# Patient Record
Sex: FEMALE | Race: OTHER | HISPANIC OR LATINO | URBAN - METROPOLITAN AREA
[De-identification: names, ages, dates, MRNs, and addresses within clinical notes are randomized per-mention and may not be internally consistent; named-entity substitution may affect disease eponyms.]

---

## 2019-10-31 ENCOUNTER — OUTPATIENT (OUTPATIENT)
Dept: OUTPATIENT SERVICES | Facility: HOSPITAL | Age: 84
LOS: 1 days | End: 2019-10-31
Payer: MEDICARE

## 2019-10-31 ENCOUNTER — APPOINTMENT (OUTPATIENT)
Dept: ORTHOPEDIC SURGERY | Facility: CLINIC | Age: 84
End: 2019-10-31

## 2019-10-31 PROCEDURE — 73521 X-RAY EXAM HIPS BI 2 VIEWS: CPT | Mod: 26

## 2019-10-31 PROCEDURE — 73521 X-RAY EXAM HIPS BI 2 VIEWS: CPT

## 2019-10-31 NOTE — PHYSICAL EXAM
[de-identified] : On exam, she is well appearing. She ambulates without assistance. \par Examination of her hips and back reveals tenderness at bilateral SI joints and her right trochanteric bursa, although to a lesser extent. \par Hip examination reveals significant stiffness bilaterally, but minimal complaints of pain.\par Distally she is 5/5 bilaterally without any sensory deficits.  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

## 2019-10-31 NOTE — DISCUSSION/SUMMARY
[de-identified] : Ms. Umana received bilateral SI joint injections without any issues. She will return if any issues arise. She was delighted with today's visit.

## 2019-10-31 NOTE — HISTORY OF PRESENT ILLNESS
[de-identified] : Ms. Umana is a pleasant 89 year old female who presents with a chronic history of bilateral SI joint pain, which has been previously injected with months of pain relief each time. She would like an injection bilaterally today. She denies trauma.

## 2019-10-31 NOTE — PROCEDURE
[Bilateral] : bilaterally.   [Major Joint___] : [unfilled] joint [Joint Pain] : Joint pain [Patient] : patient [Risk] : Risk [Benefits] : benefits [Alternatives] : alternatives [Bleeding] : bleeding [Verbal Consent Obtained] : verbal consent was obtained prior to the procedure [Posterior] : posterior [22] : a 22-gauge [1% Lidocaine___(mL)] : [unfilled] mL of 1% Lidocaine [Triamcinolone 40mg/mL___(mL)] : [unfilled] ~UmL of 40mg/mL triamcinolone [Bandage Applied] : a bandage [Tolerated Well] : The patient tolerated the procedure well [None] : None [No Strenuous Activity___day(s)] : avoid strenuous activity for [unfilled] day(s) [PRN] : as needed

## 2020-10-15 ENCOUNTER — APPOINTMENT (OUTPATIENT)
Dept: ORTHOPEDIC SURGERY | Facility: CLINIC | Age: 85
End: 2020-10-15

## 2020-10-15 ENCOUNTER — OUTPATIENT (OUTPATIENT)
Dept: OUTPATIENT SERVICES | Facility: HOSPITAL | Age: 85
LOS: 1 days | End: 2020-10-15
Payer: MEDICARE

## 2020-10-15 ENCOUNTER — EMERGENCY (EMERGENCY)
Facility: HOSPITAL | Age: 85
LOS: 1 days | Discharge: HOME | End: 2020-10-15
Attending: EMERGENCY MEDICINE | Admitting: EMERGENCY MEDICINE
Payer: MEDICARE

## 2020-10-15 ENCOUNTER — RESULT REVIEW (OUTPATIENT)
Age: 85
End: 2020-10-15

## 2020-10-15 VITALS
TEMPERATURE: 99 F | HEIGHT: 63 IN | WEIGHT: 99.21 LBS | SYSTOLIC BLOOD PRESSURE: 131 MMHG | OXYGEN SATURATION: 95 % | RESPIRATION RATE: 16 BRPM | HEART RATE: 68 BPM | DIASTOLIC BLOOD PRESSURE: 85 MMHG

## 2020-10-15 VITALS
TEMPERATURE: 99 F | HEART RATE: 71 BPM | OXYGEN SATURATION: 96 % | DIASTOLIC BLOOD PRESSURE: 75 MMHG | RESPIRATION RATE: 17 BRPM | SYSTOLIC BLOOD PRESSURE: 156 MMHG
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
## 2020-10-15 DIAGNOSIS — S00.03XA CONTUSION OF SCALP, INITIAL ENCOUNTER: ICD-10-CM

## 2020-10-15 DIAGNOSIS — S50.312A ABRASION OF LEFT ELBOW, INITIAL ENCOUNTER: ICD-10-CM

## 2020-10-15 DIAGNOSIS — Z23 ENCOUNTER FOR IMMUNIZATION: ICD-10-CM

## 2020-10-15 DIAGNOSIS — Z20.828 CONTACT WITH AND (SUSPECTED) EXPOSURE TO OTHER VIRAL COMMUNICABLE DISEASES: ICD-10-CM

## 2020-10-15 DIAGNOSIS — Z88.5 ALLERGY STATUS TO NARCOTIC AGENT: ICD-10-CM

## 2020-10-15 DIAGNOSIS — S09.90XA UNSPECIFIED INJURY OF HEAD, INITIAL ENCOUNTER: ICD-10-CM
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
## 2020-10-15 LAB — SARS-COV-2 RNA SPEC QL NAA+PROBE: SIGNIFICANT CHANGE UP

## 2020-10-15 PROCEDURE — 73080 X-RAY EXAM OF ELBOW: CPT | Mod: 26,LT

## 2020-10-15 PROCEDURE — 73521 X-RAY EXAM HIPS BI 2 VIEWS: CPT | Mod: 26

## 2020-10-15 PROCEDURE — 99284 EMERGENCY DEPT VISIT MOD MDM: CPT | Mod: 25

## 2020-10-15 PROCEDURE — U0003: CPT

## 2020-10-15 PROCEDURE — 73080 X-RAY EXAM OF ELBOW: CPT

## 2020-10-15 PROCEDURE — 90471 IMMUNIZATION ADMIN: CPT

## 2020-10-15 PROCEDURE — 73521 X-RAY EXAM HIPS BI 2 VIEWS: CPT

## 2020-10-15 PROCEDURE — 70450 CT HEAD/BRAIN W/O DYE: CPT | Mod: 26

## 2020-10-15 PROCEDURE — 70450 CT HEAD/BRAIN W/O DYE: CPT

## 2020-10-15 RX ORDER — ACETAMINOPHEN 500 MG
650 TABLET ORAL ONCE
Refills: 0 | Status: COMPLETED | OUTPATIENT
Start: 2020-10-15 | End: 2020-10-15

## 2020-10-15 RX ORDER — TETANUS TOXOID, REDUCED DIPHTHERIA TOXOID AND ACELLULAR PERTUSSIS VACCINE, ADSORBED 5; 2.5; 8; 8; 2.5 [IU]/.5ML; [IU]/.5ML; UG/.5ML; UG/.5ML; UG/.5ML
0.5 SUSPENSION INTRAMUSCULAR ONCE
Refills: 0 | Status: COMPLETED | OUTPATIENT
Start: 2020-10-15 | End: 2020-10-15

## 2020-10-15 NOTE — ED PROVIDER NOTE - NSFOLLOWUPCLINICS_GEN_ALL_ED_FT
[facility]  Family Medicine  [address]  [place], [place] [address]  Phone: [phone]  Fax:   Follow Up Time: 4-6 Days

## 2020-10-15 NOTE — ED PROVIDER NOTE - PATIENT PORTAL LINK FT
You can access the FollowMyHealth Patient Portal offered by Health system by registering at the following website: [email or web address]. By joining [employer]’s FollowMyHealth portal, you will also be able to view your health information using other applications (apps) compatible with our system.

## 2020-10-15 NOTE — ED PROVIDER NOTE - MUSCULOSKELETAL, MLM
Spine appears normal. L elbow abrasion with mild tenderness to palpation, no bony tenderness, full range of motion. L occipital scalp small abrasion with small hematoma, no suturable laceration.

## 2020-10-15 NOTE — ED PROVIDER NOTE - NEUROLOGICAL, MLM
Alert and oriented, no focal deficits, no motor or sensory deficits. Alert and oriented, no focal deficits, no motor or sensory deficits. Ambulating normally in ED.
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
## 2020-10-15 NOTE — ED ADULT NURSE NOTE - NSIMPLEMENTINTERV_GEN_ALL_ED
Implemented All Fall with Harm Risk Interventions:  [place] to call system. Call bell, personal items and telephone within reach. Instruct patient to call for assistance. Room bathroom lighting operational. Non-slip footwear when patient is off stretcher. Physically safe environment: no spills, clutter or unnecessary equipment. Stretcher in lowest position, wheels locked, appropriate side rails in place. Provide visual cue, wrist band, yellow gown, etc. Monitor gait and stability. Monitor for mental status changes and reorient to person, place, and time. Review medications for side effects contributing to fall risk. Reinforce activity limits and safety measures with patient and family. Provide visual clues: red socks.

## 2020-10-15 NOTE — ED PROVIDER NOTE - CONSTITUTIONAL, MLM
normal... Well appearing, awake, alert, oriented to person, place, time/situation and in no apparent distress. Well appearing, awake, alert, oriented and in no apparent distress. L occipital scalp small abrasion with small hematoma, no suturable laceration.

## 2020-10-15 NOTE — ED PROVIDER NOTE - NSFOLLOWUPINSTRUCTIONS_ED_ALL_ED_FT
Please follow up with your primary care doctor in 2-3 days. Return to the ER if you develop any concerning symptoms.   You were tested for Covid 19 per your request. You will get a call in 24-72 hours with the results.    Closed Head Injury    A closed head injury is an injury to your head that may or may not involve a traumatic brain injury (TBI). Symptoms of TBI can be short or long lasting and include headache, dizziness, interference with memory or speech, fatigue, confusion, changes in sleep, mood changes, nausea, depression/anxiety, and dulling of senses. Make sure to obtain proper rest which includes getting plenty of sleep, avoiding excessive visual stimulation, and avoiding activities that may cause physical or mental stress. Avoid any situation where there is potential for another head injury, including sports.    SEEK IMMEDIATE MEDICAL CARE IF YOU HAVE ANY OF THE FOLLOWING SYMPTOMS: unusual drowsiness, vomiting, severe dizziness, seizures, lightheadedness, muscular weakness, different pupil sizes, visual changes, or clear or bloody discharge from your ears or nose.

## 2020-10-15 NOTE — ED ADULT NURSE NOTE - CHPI ED NUR SYMPTOMS NEG
no confusion/no bleeding/no weakness/no loss of consciousness/no numbness/no tingling/no vomiting/no deformity/no fever

## 2020-10-15 NOTE — ED ADULT NURSE REASSESSMENT NOTE - NS ED NURSE REASSESS COMMENT FT1
Pt refused tetanus shot. PT educated on benefits of vaccine and risks of not receiving vaccine. Pt still refuses. MD Escalante aware. Pt is alert and oriented x3.

## 2020-10-15 NOTE — ED PROVIDER NOTE - OBJECTIVE STATEMENT
89 y/o F pt with PMHx of HTN and chronic lower back and hip pain, and no pertinent PSHx presents to ED s/p fall today. Pt was in outpatient radiology upstairs in [facility] for a hip XR when she tripped and fell, striking her L elbow and L head against the ground. Rapid response was called immediately and pt was brought down to ED for further evaluation. Pt currently complaints of mild pain over L head with abrasion, but denies LOC or any other acute complaints. Pt's hip XR was already completed before this episode. 89 y/o F pt with PMHx of HTN and chronic lower back and hip pain, and no pertinent PSHx presents to ED s/p fall today. Pt was in outpatient radiology upstairs in [facility] for a hip XR (which she received) after which she tripped and fell, striking her L elbow and L head against the ground. Rapid response was called immediately and pt was brought down to ED for further evaluation. Pt currently complaints of mild pain over L head with abrasion, but denies LOC or any other acute complaints. Pt's hip XR was already completed before this episode. Pt also requests Covid 19 testing. Pt was scheduled to get a steroid injection in her ?back/ ?hip after the XR but was brought down to ED instead.

## 2020-10-15 NOTE — ED PROVIDER NOTE - CLINICAL SUMMARY MEDICAL DECISION MAKING FREE TEXT BOX
89 y/o F pt presents to ED with head injury s/p fall today. Will obtain CT head, elbow XR, and give Tylenol for pain. Pt T-dap not up to date, initially agreed to T-dap but later refused. Spoke to pt's daughter who is requesting covid testing as pt has been taking public transportation. Pt agreeable to testing, however she has no signs or symptoms concerning for covid. 89 y/o F pt presents to ED with head injury s/p fall today. Will obtain CT head, elbow XR, and give Tylenol for pain. Pt T-dap not up to date, initially agreed to T-dap but later refused. Spoke to pt's daughter who is requesting covid testing as pt has been taking public transportation. Pt agreeable to testing, however she has no signs or symptoms concerning for covid. CT head with NAD and XR elbow with NAD. Pt advised of these findings and to f/up outpt.

## 2020-10-15 NOTE — ED ADULT NURSE NOTE - OBJECTIVE STATEMENT
Pt was upstairs getting x-ray of hip, mechanical trip and fall, sustaining head injury. Pt denies LOC. Denies any other sx. Pt tripped over her own feet. Small abrasion noted to L elbow. Small abrasion noted to parietal area of head.  Pt is alert and oriented x3. Speaking in complete, clear sentences.  Positive PMS x4 extremities.

## 2020-10-26 PROBLEM — I10 ESSENTIAL (PRIMARY) HYPERTENSION: Chronic | Status: ACTIVE | Noted: 2020-10-19

## 2020-10-26 PROBLEM — M25.559 PAIN IN UNSPECIFIED HIP: Chronic | Status: ACTIVE | Noted: 2020-10-15

## 2020-11-05 ENCOUNTER — APPOINTMENT (OUTPATIENT)
Dept: ORTHOPEDIC SURGERY | Facility: CLINIC | Age: 85
End: 2020-11-05
Payer: MEDICARE

## 2020-11-05 DIAGNOSIS — M47.818 SPONDYLOSIS W/OUT MYELOPATHY OR RADICULOPATHY, SACRAL AND SACROCOCCYGEAL REGION: ICD-10-CM

## 2020-11-05 PROCEDURE — 99212 OFFICE O/P EST SF 10 MIN: CPT

## 2020-11-05 NOTE — PHYSICAL EXAM
[de-identified] : Gen: No acute distress, appears comfortable \par Back: Stable, no step offs, tender along lower back and at SI joint \par BL LE sensation intact \par BL LE motor strength 5/5\par 2+ DP pulse \par Toes WWP [de-identified] : No imaging

## 2020-11-05 NOTE — HISTORY OF PRESENT ILLNESS
[de-identified] : 89F hx chronic bilateral SI joint pain s/p SI joint injection x1 year presenting for another SI joint injection. Reports that she was here on 10/15 for another injection but fell and did not get her injection. Was seen and evaluated and xrays were negative for fractures and dislocations. States that she gets pain relief for at least 6 months after every injection. She would like to have another injection today. Ambulates without any assisted devices at baseline. Denies fevers, chills, nausea or vomiting. No new falls since 10/15.

## 2020-11-05 NOTE — ASSESSMENT
[FreeTextEntry1] : 89F history of chronic bilateral SI joint pain s/p injection x1 year here for another injection \par \par Plan:\par Referral to Dr. Reed for SI joint injection \par WBAT

## 2020-11-06 PROBLEM — M47.818 SI JOINT ARTHRITIS: Status: ACTIVE | Noted: 2019-10-31

## 2024-12-16 ENCOUNTER — EMERGENCY (EMERGENCY)
Facility: HOSPITAL | Age: 88
LOS: 1 days | Discharge: HOME | End: 2024-12-16
Attending: EMERGENCY MEDICINE | Admitting: EMERGENCY MEDICINE
Payer: MEDICARE

## 2024-12-16 VITALS
SYSTOLIC BLOOD PRESSURE: 143 MMHG | OXYGEN SATURATION: 94 % | DIASTOLIC BLOOD PRESSURE: 97 MMHG | TEMPERATURE: 98 F | RESPIRATION RATE: 18 BRPM | HEART RATE: 117 BPM

## 2024-12-16 VITALS
RESPIRATION RATE: 19 BRPM | OXYGEN SATURATION: 95 % | TEMPERATURE: 99 F | SYSTOLIC BLOOD PRESSURE: 128 MMHG | DIASTOLIC BLOOD PRESSURE: 89 MMHG | HEART RATE: 101 BPM

## 2024-12-16 DIAGNOSIS — Z01.89 ENCOUNTER FOR OTHER SPECIFIED SPECIAL EXAMINATIONS: ICD-10-CM

## 2024-12-16 DIAGNOSIS — Z88.5 ALLERGY STATUS TO NARCOTIC AGENT: ICD-10-CM

## 2024-12-16 PROCEDURE — 99284 EMERGENCY DEPT VISIT MOD MDM: CPT | Mod: 25

## 2024-12-16 PROCEDURE — 93005 ELECTROCARDIOGRAM TRACING: CPT

## 2024-12-16 PROCEDURE — 99283 EMERGENCY DEPT VISIT LOW MDM: CPT | Mod: 25
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
## 2024-12-16 NOTE — ED PROVIDER NOTE - CLINICAL SUMMARY MEDICAL DECISION MAKING FREE TEXT BOX
89-year-old female waiting for daughter has no complaints is well-appearing here for medical screening exam EKG is sinus patient is anxious stable for DC

## 2024-12-16 NOTE — ED PROVIDER NOTE - PROGRESS NOTE DETAILS
and is no longer tachycardic is well-appearing daughter is here patient feels relieved will DC into daughter's care

## 2024-12-16 NOTE — ED PROVIDER NOTE - PATIENT PORTAL LINK FT
You can access the FollowMyHealth Patient Portal offered by [facility] by registering at the following website: [email or web address]. By joining [employer]’s FollowMyHealth portal, you will also be able to view your health information using other applications (apps) compatible with our system.